# Patient Record
(demographics unavailable — no encounter records)

---

## 2025-03-06 NOTE — PHYSICAL EXAM
[Chaperone Present] : A chaperone was present in the examining room during all aspects of the physical examination [FreeTextEntry2] : BRYNN Kent [Appropriately responsive] : appropriately responsive [Alert] : alert [No Acute Distress] : no acute distress [No Lymphadenopathy] : no lymphadenopathy [Regular Rate Rhythm] : regular rate rhythm [No Murmurs] : no murmurs [Clear to Auscultation B/L] : clear to auscultation bilaterally [Soft] : soft [Non-tender] : non-tender [Non-distended] : non-distended [No HSM] : No HSM [No Lesions] : no lesions [No Mass] : no mass [Oriented x3] : oriented x3 [Examination Of The Breasts] : a normal appearance [No Masses] : no breast masses were palpable [Labia Majora] : normal [Labia Minora] : normal [Normal] : normal [Uterine Adnexae] : normal

## 2025-03-06 NOTE — PLAN
[FreeTextEntry1] : 30-year-old female for well woman exam  1.  Pap done with GC cultures 2.  Self breast exam teaching was done 3.  Patient with history of heavy menses and back pain.  She is currently using condoms for contraception.  She is interested in discussing other contraception options today.  We reviewed nonhormonal versus estrogen and progesterone containing options versus progesterone only options.  She is most interested in trying a combined OCP.  Rx was given for Aleena 24 FE.  All risk, benefits and potential complications of combined OCPs were reviewed with the patient.  Instructions were reviewed.  Counseling was given.  She will return to the office in 3 months for blood pressure and OCP check. 4.  Patient did have to take Plan B this month.  She did not resume her normal menses on schedule.  She took a urine pregnancy test at home which was negative.  Patient was instructed to repeat urine pregnancy test in the office in 1 week time if she does not get her menses.  Patient will call if menses do not resume her urine pregnancy test is positive. 5.  Patient was given a prescription for STI blood work.  Risks and benefits of STI blood work reviewed with the patient.  Will call with results. 6.  Annual exam in 1 year  The patient was given the opportunity to ask questions and all were answered to her satisfaction.

## 2025-03-06 NOTE — HISTORY OF PRESENT ILLNESS
[FreeTextEntry1] : 30-year-old female presents for well woman exam.  She is a new patient to our practice.  Her last GYN visit was greater than 5 years ago.  Her menarche was at the age of 12.  Menses are every 28 days, lasting 5 days.  She does get heavy bleeding for 1 day of her cycle but does not soak through more than 1 pad per hour.  She gets back pain which is relieved with naproxen.  She also gets nausea with her menses.  She has been using condoms for contraception but is interested in discussing other contraception options today.  She did take Plan B last month.  Her last menstrual period was January 22.  The patient did take a pregnancy test at home which was negative.  She declines urine pregnancy test in the office today.  She is requesting routine STI testing.    Past medical history is significant for anxiety and depression.  She has never had surgery.  Family history is significant for maternal aunt with breast cancer over the age of 50.  She does use an electronic cigarette.  She denies tobacco or illicit drugs.  GYN history as above.  Nulligravida.  No known drug allergies.  She takes Lexapro.